# Patient Record
Sex: FEMALE | URBAN - METROPOLITAN AREA
[De-identification: names, ages, dates, MRNs, and addresses within clinical notes are randomized per-mention and may not be internally consistent; named-entity substitution may affect disease eponyms.]

---

## 2019-07-02 ENCOUNTER — APPOINTMENT (OUTPATIENT)
Age: 29
Setting detail: DERMATOLOGY
End: 2019-07-02

## 2019-07-02 DIAGNOSIS — L80 VITILIGO: ICD-10-CM

## 2019-07-02 PROCEDURE — OTHER FOLLOW UP FOR NEXT VISIT: OTHER

## 2019-07-02 PROCEDURE — 99202 OFFICE O/P NEW SF 15 MIN: CPT

## 2019-07-02 PROCEDURE — OTHER TREATMENT REGIMEN: OTHER

## 2019-07-02 PROCEDURE — OTHER MIPS QUALITY: OTHER

## 2019-07-02 PROCEDURE — OTHER COUNSELING: OTHER

## 2019-07-02 ASSESSMENT — SEVERITY VITILIGO: VITILIGO SEVERITY: 3

## 2019-07-02 NOTE — PROCEDURE: TREATMENT REGIMEN
Plan: I will try to find out the names of the topical medications that she has used. We will try calling her primary care physician. She is also going to email us a photo of the medicine she used most recently. I will text Dr. Zuleika Justice regarding possible laser-based or light-based treatment.  Excimer is $50 per treatment and NBUVB is $35
Detail Level: Simple

## 2019-07-02 NOTE — HPI: RASH
What Type Of Note Output Would You Prefer (Optional)?: Standard Output
How Severe Is Your Rash?: mild
Is This A New Presentation, Or A Follow-Up?: Rash
Additional History: Patient essentially has patches of hypo pigmented skin on the arms and legs for the past several years. She has used an unknown topical medication for over one year that seemed to help only slightly. She is bothered by the overall appearance of the lesions. They do occasionally itch but are otherwise asymptomatic. She is just interested in what treatment options might be available